# Patient Record
Sex: MALE | Race: BLACK OR AFRICAN AMERICAN | NOT HISPANIC OR LATINO | URBAN - METROPOLITAN AREA
[De-identification: names, ages, dates, MRNs, and addresses within clinical notes are randomized per-mention and may not be internally consistent; named-entity substitution may affect disease eponyms.]

---

## 2023-08-15 ENCOUNTER — EMERGENCY (EMERGENCY)
Facility: HOSPITAL | Age: 28
LOS: 1 days | Discharge: HOME | End: 2023-08-15
Attending: EMERGENCY MEDICINE | Admitting: EMERGENCY MEDICINE
Payer: SELF-PAY

## 2023-08-15 VITALS
RESPIRATION RATE: 18 BRPM | HEART RATE: 68 BPM | OXYGEN SATURATION: 100 % | TEMPERATURE: 98 F | SYSTOLIC BLOOD PRESSURE: 138 MMHG | DIASTOLIC BLOOD PRESSURE: 78 MMHG

## 2023-08-15 VITALS
DIASTOLIC BLOOD PRESSURE: 102 MMHG | TEMPERATURE: 98 F | SYSTOLIC BLOOD PRESSURE: 150 MMHG | OXYGEN SATURATION: 100 % | HEART RATE: 49 BPM | RESPIRATION RATE: 18 BRPM

## 2023-08-15 LAB
ALBUMIN SERPL ELPH-MCNC: 4.9 G/DL — SIGNIFICANT CHANGE UP (ref 3.3–5)
ALP SERPL-CCNC: 74 U/L — SIGNIFICANT CHANGE UP (ref 40–120)
ALT FLD-CCNC: 26 U/L — SIGNIFICANT CHANGE UP (ref 4–41)
ANION GAP SERPL CALC-SCNC: 15 MMOL/L — HIGH (ref 7–14)
AST SERPL-CCNC: 37 U/L — SIGNIFICANT CHANGE UP (ref 4–40)
BASOPHILS # BLD AUTO: 0.02 K/UL — SIGNIFICANT CHANGE UP (ref 0–0.2)
BASOPHILS NFR BLD AUTO: 0.2 % — SIGNIFICANT CHANGE UP (ref 0–2)
BILIRUB SERPL-MCNC: 0.4 MG/DL — SIGNIFICANT CHANGE UP (ref 0.2–1.2)
BLOOD GAS VENOUS COMPREHENSIVE RESULT: SIGNIFICANT CHANGE UP
BLOOD GAS VENOUS COMPREHENSIVE RESULT: SIGNIFICANT CHANGE UP
BUN SERPL-MCNC: 10 MG/DL — SIGNIFICANT CHANGE UP (ref 7–23)
CALCIUM SERPL-MCNC: 10.2 MG/DL — SIGNIFICANT CHANGE UP (ref 8.4–10.5)
CHLORIDE SERPL-SCNC: 99 MMOL/L — SIGNIFICANT CHANGE UP (ref 98–107)
CO2 SERPL-SCNC: 22 MMOL/L — SIGNIFICANT CHANGE UP (ref 22–31)
CREAT SERPL-MCNC: 0.88 MG/DL — SIGNIFICANT CHANGE UP (ref 0.5–1.3)
EGFR: 119 ML/MIN/1.73M2 — SIGNIFICANT CHANGE UP
EOSINOPHIL # BLD AUTO: 0 K/UL — SIGNIFICANT CHANGE UP (ref 0–0.5)
EOSINOPHIL NFR BLD AUTO: 0 % — SIGNIFICANT CHANGE UP (ref 0–6)
GLUCOSE SERPL-MCNC: 123 MG/DL — HIGH (ref 70–99)
HCT VFR BLD CALC: 44.5 % — SIGNIFICANT CHANGE UP (ref 39–50)
HGB BLD-MCNC: 14.7 G/DL — SIGNIFICANT CHANGE UP (ref 13–17)
IANC: 9.5 K/UL — HIGH (ref 1.8–7.4)
IMM GRANULOCYTES NFR BLD AUTO: 0.4 % — SIGNIFICANT CHANGE UP (ref 0–0.9)
LIDOCAIN IGE QN: 20 U/L — SIGNIFICANT CHANGE UP (ref 7–60)
LYMPHOCYTES # BLD AUTO: 0.96 K/UL — LOW (ref 1–3.3)
LYMPHOCYTES # BLD AUTO: 8.8 % — LOW (ref 13–44)
MCHC RBC-ENTMCNC: 28.4 PG — SIGNIFICANT CHANGE UP (ref 27–34)
MCHC RBC-ENTMCNC: 33 GM/DL — SIGNIFICANT CHANGE UP (ref 32–36)
MCV RBC AUTO: 85.9 FL — SIGNIFICANT CHANGE UP (ref 80–100)
MONOCYTES # BLD AUTO: 0.41 K/UL — SIGNIFICANT CHANGE UP (ref 0–0.9)
MONOCYTES NFR BLD AUTO: 3.8 % — SIGNIFICANT CHANGE UP (ref 2–14)
NEUTROPHILS # BLD AUTO: 9.5 K/UL — HIGH (ref 1.8–7.4)
NEUTROPHILS NFR BLD AUTO: 86.8 % — HIGH (ref 43–77)
NRBC # BLD: 0 /100 WBCS — SIGNIFICANT CHANGE UP (ref 0–0)
NRBC # FLD: 0 K/UL — SIGNIFICANT CHANGE UP (ref 0–0)
PLATELET # BLD AUTO: 312 K/UL — SIGNIFICANT CHANGE UP (ref 150–400)
POTASSIUM SERPL-MCNC: 4.5 MMOL/L — SIGNIFICANT CHANGE UP (ref 3.5–5.3)
POTASSIUM SERPL-SCNC: 4.5 MMOL/L — SIGNIFICANT CHANGE UP (ref 3.5–5.3)
PROT SERPL-MCNC: 8.3 G/DL — SIGNIFICANT CHANGE UP (ref 6–8.3)
RBC # BLD: 5.18 M/UL — SIGNIFICANT CHANGE UP (ref 4.2–5.8)
RBC # FLD: 12.4 % — SIGNIFICANT CHANGE UP (ref 10.3–14.5)
SODIUM SERPL-SCNC: 136 MMOL/L — SIGNIFICANT CHANGE UP (ref 135–145)
WBC # BLD: 10.93 K/UL — HIGH (ref 3.8–10.5)
WBC # FLD AUTO: 10.93 K/UL — HIGH (ref 3.8–10.5)

## 2023-08-15 PROCEDURE — 99284 EMERGENCY DEPT VISIT MOD MDM: CPT

## 2023-08-15 PROCEDURE — 93010 ELECTROCARDIOGRAM REPORT: CPT

## 2023-08-15 PROCEDURE — 99053 MED SERV 10PM-8AM 24 HR FAC: CPT

## 2023-08-15 RX ORDER — FAMOTIDINE 10 MG/ML
20 INJECTION INTRAVENOUS ONCE
Refills: 0 | Status: COMPLETED | OUTPATIENT
Start: 2023-08-15 | End: 2023-08-15

## 2023-08-15 RX ORDER — ONDANSETRON 8 MG/1
4 TABLET, FILM COATED ORAL ONCE
Refills: 0 | Status: COMPLETED | OUTPATIENT
Start: 2023-08-15 | End: 2023-08-15

## 2023-08-15 RX ORDER — SODIUM CHLORIDE 9 MG/ML
1000 INJECTION INTRAMUSCULAR; INTRAVENOUS; SUBCUTANEOUS ONCE
Refills: 0 | Status: COMPLETED | OUTPATIENT
Start: 2023-08-15 | End: 2023-08-15

## 2023-08-15 RX ORDER — KETOROLAC TROMETHAMINE 30 MG/ML
15 SYRINGE (ML) INJECTION ONCE
Refills: 0 | Status: DISCONTINUED | OUTPATIENT
Start: 2023-08-15 | End: 2023-08-15

## 2023-08-15 RX ADMIN — Medication 15 MILLIGRAM(S): at 04:25

## 2023-08-15 RX ADMIN — Medication 30 MILLILITER(S): at 04:25

## 2023-08-15 RX ADMIN — SODIUM CHLORIDE 1000 MILLILITER(S): 9 INJECTION INTRAMUSCULAR; INTRAVENOUS; SUBCUTANEOUS at 07:07

## 2023-08-15 RX ADMIN — SODIUM CHLORIDE 1000 MILLILITER(S): 9 INJECTION INTRAMUSCULAR; INTRAVENOUS; SUBCUTANEOUS at 04:24

## 2023-08-15 RX ADMIN — ONDANSETRON 4 MILLIGRAM(S): 8 TABLET, FILM COATED ORAL at 04:25

## 2023-08-15 RX ADMIN — FAMOTIDINE 20 MILLIGRAM(S): 10 INJECTION INTRAVENOUS at 04:25

## 2023-08-15 NOTE — ED PROVIDER NOTE - ATTENDING CONTRIBUTION TO CARE
I, Dr Tristin Miranda wrote the initial note in its entirety and the resident only contributed to the progress note and disposition with which I agree.
15-Dec-2017

## 2023-08-15 NOTE — ED PROVIDER NOTE - PATIENT PORTAL LINK FT
You can access the FollowMyHealth Patient Portal offered by [facility] by registering at the following website: [email or web address]. By joining [email or web address]’s FollowMyHealth portal, you will also be able to view your health information using other applications (apps) compatible with our system.

## 2023-08-15 NOTE — ED PROVIDER NOTE - PHYSICAL EXAMINATION
Uncomfortable appearing laying on his stomach problem.  Abdomen is diffusely soft nontender with negative Regan's negative McBurney's and no CVA tenderness bilaterally.  No signs of cirrhosis.  No hernias and .

## 2023-08-15 NOTE — ED ADULT NURSE NOTE - OBJECTIVE STATEMENT
Pt received in 5A, 28Y M Aox4, ambulatory c/o abd pain with n/v after eating take out food tonight. Denies daily meds/ medical hx. Pt respirations even and unlabored, IV placed 20G Rt forearm, labs sent, medicated as ordered, family at bedside instructed to call for assistance as needed.

## 2023-08-15 NOTE — ED ADULT TRIAGE NOTE - CHIEF COMPLAINT QUOTE
c/o diffused abd pain and multiple episodes of N/V after eating takeout food tonight, denies medical hx.

## 2023-08-15 NOTE — ED PROVIDER NOTE - CLINICAL SUMMARY MEDICAL DECISION MAKING FREE TEXT BOX
28-year-old male with no known past medical history no surgical history that presents with epigastric abdominal pain diffusely radiating to the rest of abdomen associated with increased belching and gas production after eating dark from a [place] restaurant tonight.  His abdomen exam is benign in terms of not having a positive Regan's and negative McBurney sign.  No CVA tenderness.  Likely gastritis versus GERD unlikely kidney stones unlikely thoracic dissection has no risk factors no palpable pulsatile masses unlikely gallstones unlikely appendicitis.  At this time will obtain labs including lipase and LFTs provide medications and fluids and reassess.  If patient pain not well controlled continues to have nausea or vomiting then will consider abdominal imaging.  At this time clinically no indication for those at this time.

## 2023-08-15 NOTE — ED PROVIDER NOTE - NSFOLLOWUPINSTRUCTIONS_ED_ALL_ED_FT
You were seen in the ED for ABDOMINAL PAIN    It was determined that you have no life threatening injuries or condition that requires you to be admitted to the hospital or have any additional testing while in the ED.     Attached are your results from todays visit.     Please take these results to follow up with your primary care doctor so that they can determine if you need any additional testing or treatment as an outpatient.     If your symptoms worsen or change, you can return to the ED for further evaluation and care at that time. You were seen in the ED for ABDOMINAL PAIN  Please follow-up with your gastroenterologist.  If you do not have 1 information for 1 can be found below    It was determined that you have no life threatening injuries or condition that requires you to be admitted to the hospital or have any additional testing while in the ED.     Attached are your results from today's visit.     Please take these results to follow up with your primary care doctor so that they can determine if you need any additional testing or treatment as an outpatient.     If your symptoms worsen or change, you can return to the ED for further evaluation and care at that time.

## 2023-08-15 NOTE — ED ADULT NURSE NOTE - NSFALLUNIVINTERV_ED_ALL_ED
Bed/Stretcher in lowest position, wheels locked, appropriate side rails in place/Call bell, personal items and telephone in reach/Instruct patient to call for assistance before getting out of bed/chair/stretcher/Non-slip footwear applied when patient is off stretcher/[place] to call system/Physically safe environment - no spills, clutter or unnecessary equipment/Purposeful proactive rounding/Room/bathroom lighting operational, light cord in reach

## 2023-08-15 NOTE — ED PROVIDER NOTE - NSFOLLOWUPCLINICS_GEN_ALL_ED_FT
[facility]  Gastroenterology  [address] [place] [id number]  Phone: [phone]  Fax:   Follow Up Time: 4-6 Days

## 2023-08-15 NOTE — ED PROVIDER NOTE - OBJECTIVE STATEMENT
28-year-old male with no known past medical history no surgical history that presents with epigastric abdominal pain starting approximately 6 hours prior to arrival.  Patient states that at 9 PM approximately 1 hour prior to onset of pain he ate some dark from a [place] restaurant was sitting at rest when sudden onset epigastric pain associated with nausea and one bout of nonbloody nonbilious emesis.  Since then he has had epigastric pain radiating diffusely across his abdomen and associated with belching and increased gas production.  Denies any chest pain or shortness of breath denies any diarrhea.  Recently has been well no back pain or flank pain.  No urinary symptoms including hematuria.  Has not tried any medications at home.  This is happened multiple times in the past.  Denies any recent trauma or travel and no other sick contacts nobody else ate the same thing and nobody else is sick at home.  Non-smoker no drinking no drugs.

## 2023-08-15 NOTE — ED PROVIDER NOTE - PROGRESS NOTE DETAILS
[facility] PGY 3 Patient states that he is feeling better pain is gone in the epigastric region.  Currently pending repeat lactate improved from 3.1-2.5 after IV hydration    Patient tolerated p.o. Dr. Kemp: Pt was signed out to me awaiting rpt lactate. Pt notes feeling better. Tolerating PO.

## 2024-07-08 NOTE — ED PROVIDER NOTE - SEVERE SEPSIS ALERT DETAILS
No care due was identified.  [facility] Embedded Care Due Messages. Reference number: [id number].   7/08/2024 8:38:52 AM CDT   Dr. Kemp: Given presentation, elevated lactate likely related to volume loss from vomiting over an infectious etiology.